# Patient Record
Sex: FEMALE | Race: WHITE
[De-identification: names, ages, dates, MRNs, and addresses within clinical notes are randomized per-mention and may not be internally consistent; named-entity substitution may affect disease eponyms.]

---

## 2018-01-25 ENCOUNTER — HOSPITAL ENCOUNTER (OUTPATIENT)
Dept: HOSPITAL 92 - CTENTCT | Age: 83
Discharge: HOME | End: 2018-01-25
Attending: OTOLARYNGOLOGY
Payer: MEDICARE

## 2018-01-25 DIAGNOSIS — J32.9: Primary | ICD-10-CM

## 2018-01-25 PROCEDURE — 70486 CT MAXILLOFACIAL W/O DYE: CPT

## 2018-03-14 ENCOUNTER — HOSPITAL ENCOUNTER (OUTPATIENT)
Dept: HOSPITAL 92 - SDC | Age: 83
Discharge: HOME | End: 2018-03-14
Attending: OTOLARYNGOLOGY
Payer: MEDICARE

## 2018-03-14 VITALS — BODY MASS INDEX: 20.9 KG/M2

## 2018-03-14 DIAGNOSIS — J34.3: ICD-10-CM

## 2018-03-14 DIAGNOSIS — I48.91: ICD-10-CM

## 2018-03-14 DIAGNOSIS — Z79.899: ICD-10-CM

## 2018-03-14 DIAGNOSIS — F41.9: ICD-10-CM

## 2018-03-14 DIAGNOSIS — Z88.5: ICD-10-CM

## 2018-03-14 DIAGNOSIS — G47.33: ICD-10-CM

## 2018-03-14 DIAGNOSIS — Z79.01: ICD-10-CM

## 2018-03-14 DIAGNOSIS — Z88.8: ICD-10-CM

## 2018-03-14 DIAGNOSIS — J32.9: Primary | ICD-10-CM

## 2018-03-14 DIAGNOSIS — Z91.041: ICD-10-CM

## 2018-03-14 DIAGNOSIS — M06.9: ICD-10-CM

## 2018-03-14 DIAGNOSIS — I11.0: ICD-10-CM

## 2018-03-14 DIAGNOSIS — I50.9: ICD-10-CM

## 2018-03-14 LAB
ANION GAP SERPL CALC-SCNC: 12 MMOL/L (ref 10–20)
BUN SERPL-MCNC: 12 MG/DL (ref 9.8–20.1)
CALCIUM SERPL-MCNC: 10 MG/DL (ref 7.8–10.44)
CHLORIDE SERPL-SCNC: 106 MMOL/L (ref 98–107)
CO2 SERPL-SCNC: 27 MMOL/L (ref 23–31)
CREAT CL PREDICTED SERPL C-G-VRATE: 46 ML/MIN (ref 70–130)
GLUCOSE SERPL-MCNC: 100 MG/DL (ref 83–110)
HGB BLD-MCNC: 13.4 G/DL (ref 12–16)
POTASSIUM SERPL-SCNC: 4.4 MMOL/L (ref 3.5–5.1)
SODIUM SERPL-SCNC: 141 MMOL/L (ref 136–145)

## 2018-03-14 PROCEDURE — 85014 HEMATOCRIT: CPT

## 2018-03-14 PROCEDURE — 09TL8ZZ RESECTION OF NASAL TURBINATE, VIA NATURAL OR ARTIFICIAL OPENING ENDOSCOPIC: ICD-10-PCS | Performed by: OTOLARYNGOLOGY

## 2018-03-14 PROCEDURE — 85018 HEMOGLOBIN: CPT

## 2018-03-14 PROCEDURE — 09TV8ZZ RESECTION OF LEFT ETHMOID SINUS, VIA NATURAL OR ARTIFICIAL OPENING ENDOSCOPIC: ICD-10-PCS | Performed by: OTOLARYNGOLOGY

## 2018-03-14 PROCEDURE — 93005 ELECTROCARDIOGRAM TRACING: CPT

## 2018-03-14 PROCEDURE — 09BT8ZZ EXCISION OF LEFT FRONTAL SINUS, VIA NATURAL OR ARTIFICIAL OPENING ENDOSCOPIC: ICD-10-PCS | Performed by: OTOLARYNGOLOGY

## 2018-03-14 PROCEDURE — 09TU8ZZ RESECTION OF RIGHT ETHMOID SINUS, VIA NATURAL OR ARTIFICIAL OPENING ENDOSCOPIC: ICD-10-PCS | Performed by: OTOLARYNGOLOGY

## 2018-03-14 PROCEDURE — 36415 COLL VENOUS BLD VENIPUNCTURE: CPT

## 2018-03-14 PROCEDURE — 099R8ZZ DRAINAGE OF LEFT MAXILLARY SINUS, VIA NATURAL OR ARTIFICIAL OPENING ENDOSCOPIC: ICD-10-PCS | Performed by: OTOLARYNGOLOGY

## 2018-03-14 PROCEDURE — 80048 BASIC METABOLIC PNL TOTAL CA: CPT

## 2018-03-14 PROCEDURE — 099Q8ZZ DRAINAGE OF RIGHT MAXILLARY SINUS, VIA NATURAL OR ARTIFICIAL OPENING ENDOSCOPIC: ICD-10-PCS | Performed by: OTOLARYNGOLOGY

## 2018-03-14 PROCEDURE — 93010 ELECTROCARDIOGRAM REPORT: CPT

## 2018-05-11 ENCOUNTER — HOSPITAL ENCOUNTER (OUTPATIENT)
Dept: HOSPITAL 92 - SCSCT | Age: 83
Discharge: HOME | End: 2018-05-11
Attending: FAMILY MEDICINE
Payer: MEDICARE

## 2018-05-11 DIAGNOSIS — I51.7: ICD-10-CM

## 2018-05-11 DIAGNOSIS — K57.30: ICD-10-CM

## 2018-05-11 DIAGNOSIS — K76.89: ICD-10-CM

## 2018-05-11 DIAGNOSIS — R19.7: Primary | ICD-10-CM

## 2018-05-11 DIAGNOSIS — R10.9: ICD-10-CM

## 2018-05-11 DIAGNOSIS — Z90.710: ICD-10-CM

## 2018-05-11 DIAGNOSIS — R18.8: ICD-10-CM

## 2018-05-11 PROCEDURE — 74177 CT ABD & PELVIS W/CONTRAST: CPT

## 2018-05-11 NOTE — CT
CT ABDOMEN AND PELVIS WITH IV CONTRAST:

 

Date:  05/11/18 

 

HISTORY: 

Abdominal pain and diarrhea for several weeks. 

 

COMPARISON:  

06/21/16. 

 

FINDINGS:

There is bibasilar atelectasis. 

 

Partial visualization of cardiac pacemaking leads are seen within the right ventricle and right atria
l appendage. The heart is enlarged. 

 

Vascular calcifications are seen in the abdominal aorta and involving the iliac arteries. 

 

Calcified granulomata are again seen in the liver and spleen. There does appear to be nodular periphe
ral contour of the liver, which can be seen with cirrhosis. No focal hepatic mass is seen. The spleen
 is not enlarged. 

 

The pancreas, bilateral adrenal glands, kidneys, and urinary bladder demonstrate a normal CT appearan
ce. 

 

There is evidence of prior hysterectomy. 

 

Colonic diverticulosis is present, predominantly within the region of the sigmoid colon. The opacifie
d small bowel is normal in caliber. 

 

There are nonspecific, nonenlarged aortocaval lymph nodes unchanged in number compared to prior exam.
 

 

There is a small amount of intraperitoneal free fluid within the abdomen and pelvis. A very tiny fat-
containing umbilical hernia is present. 

 

 

IMPRESSION: 

 

1.  Small amount of ascites. 

 

2.  Nodular contour of the liver, likely attributable to cirrhosis. There is no evidence of splenomeg
maria guadalupe. 

 

3.  Cardiomegaly with reflux of contrast into distended IVC and hepatic veins. 

 

4.  Colonic diverticulosis. 

 

5.  Hysterectomy. 

 

 

 

POS: [facility]

## 2018-11-26 ENCOUNTER — HOSPITAL ENCOUNTER (OUTPATIENT)
Dept: HOSPITAL 92 - SCSCT | Age: 83
Discharge: HOME | End: 2018-11-26
Attending: FAMILY MEDICINE
Payer: MEDICARE

## 2018-11-26 DIAGNOSIS — Z98.1: ICD-10-CM

## 2018-11-26 DIAGNOSIS — R22.0: Primary | ICD-10-CM

## 2018-11-26 DIAGNOSIS — C07: ICD-10-CM

## 2018-11-26 LAB — ESTIMATED GFR-MDRD - POC: 53

## 2018-11-26 PROCEDURE — 70491 CT SOFT TISSUE NECK W/DYE: CPT

## 2018-11-26 PROCEDURE — 82565 ASSAY OF CREATININE: CPT

## 2018-11-26 NOTE — CT
CT NECK WITH CONTRAST:

 

11/26/2018

 

HISTORY:

Palpable mass in the left cheek.

 

FINDINGS:

There is an approximately 5 mm pulmonary nodule in the left upper lobe, abutting a fissure, which is 
unchanged compared to a CT angiogram of the chest of 04/02/2012, and therefore benign.  Again noted i
s the left subclavian pacemaker.  There is ACDF hardware at C5, C6, and C7.  No destructive osseous l
esion.  At the site of the external marker for the palpable lump, there is a small, approximately 1.4
 x 1.3 x 0.9 cm moderately enhancing solid mass, broadly abutting the lateral surface of the left mas
seter muscle, arising from the accessory lobe or anterior extension, of the left parotid gland.  The 
contralateral right parotid gland is absent.  There is no ectasia of the parotid or submandibular martin
ts.  There is no evidence of infiltrative lesion in the pterygopalatine fossa.  Other than the abutme
nt of the left masseter muscle, no other abnormality is identified involving the  spaces.  
No definite pathology is identified involving the submandibular, pharyngeal mucosal, retropharyngeal,
 parapharyngeal, perivertebral, or posterior cervical spaces.  No significant cervical lymphadenopath
y.  There is atherosclerotic calcification of the left proximal internal carotid artery, including th
e carotid bulb.  Mild atherosclerotic calcification of contralateral right carotid bulb.  No evidence
 of high-grade stenosis involving the vessels.  No other abnormality of the carotid space. No major p
athology of the larynx identified.  Left thyroid calcification and small bilateral thyroid nodules.  
These were demonstrated on the CT angiogram of the chest of 04/02/2012.

 

IMPRESSION:

1.  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 
gland.

 

2.  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

 

3.  Status post anterior cervical diskectomy and fusion of the mid and lower cervical spine.

 

POS: [facility]